# Patient Record
(demographics unavailable — no encounter records)

---

## 2025-05-13 NOTE — END OF VISIT
[FreeTextEntry3] : I, Kamille Jaylon, acted as a scribe on behalf of Dr. Rehana Carver D.O. on 05/13/2025.  All medical entries made by the scribe were at my, Dr. Rehaan Carver D.O, direction and personally dictated by me on 05/13/2025. I have reviewed the chart and agree that the record accurately reflects my personal performance of the history, physical exam, assessment and plan. I have also personally directed, reviewed, and agreed with the chart.

## 2025-05-13 NOTE — PHYSICAL EXAM

## 2025-05-13 NOTE — HISTORY OF PRESENT ILLNESS
[FreeTextEntry1] : 39 year old female presents for an annual and to discuss IUD options. Pt is doing well with no complaints.  OBHx:  x2, postpartum depression x2 GynHx: Mirena IUD 2018  Allergies: NKDA

## 2025-05-13 NOTE — PLAN
[FreeTextEntry1] : 39 year old female presents for an annual  -PAP done -Pt would like to continue with IUD, IUD string not seen on exam  RTO for IUD removal and insertion with sono guidance